# Patient Record
Sex: FEMALE | Race: WHITE | NOT HISPANIC OR LATINO | URBAN - NONMETROPOLITAN AREA
[De-identification: names, ages, dates, MRNs, and addresses within clinical notes are randomized per-mention and may not be internally consistent; named-entity substitution may affect disease eponyms.]

---

## 2018-06-19 PROBLEM — H52.4: Noted: 2018-06-19

## 2018-06-19 PROBLEM — H10.423: Noted: 2018-06-19

## 2018-06-19 PROBLEM — H26.491: Noted: 2018-06-19

## 2018-06-19 PROBLEM — H43.811: Noted: 2018-06-19

## 2018-06-19 PROBLEM — H52.223: Noted: 2018-06-19

## 2018-06-19 PROBLEM — Z96.1: Noted: 2018-06-19

## 2019-06-25 ENCOUNTER — IMPORTED ENCOUNTER (OUTPATIENT)
Dept: URBAN - NONMETROPOLITAN AREA CLINIC 1 | Facility: CLINIC | Age: 69
End: 2019-06-25

## 2019-06-25 PROCEDURE — 92014 COMPRE OPH EXAM EST PT 1/>: CPT

## 2019-06-25 PROCEDURE — 92015 DETERMINE REFRACTIVE STATE: CPT

## 2019-06-25 NOTE — PATIENT DISCUSSION
P/C IOL OU PCO OD:  -  Discussed findings w/ pt today-  Stable doing well at this time-  Mild PCO noted OD but not visually significant-  Hx of Yag PC OS open capsule noted-  Monitor yearly or PRNOcular Allergies OU-  discussed findings w/ pt today-  signs/ymptoms associated discussed-  currently using [facility] brand OTC products-  recommended Zaditor or [place] OU BID PRN itching written down for pt today-  monitor PRNA[place]-  Discussed refractive status in detail w/ pt today-  New GLRx given but not required. -  Monitor yearlyor PRNPVD OD:  -  Discussed findings of exam in detail with the patient. -  The risk of retinal detachment in patients with PVDs was discussed with the patient and the warning signs of retinal detachment were carefully reviewed with the patient. -  The patient was warned to return to the office or contact the ophthalmologist on call immediately if they experience signs of retinal detachment or changes in vision noted from today.  -  Continue to monitor.; 's Notes: MR  6/19/18DFE  6/19/18

## 2022-04-10 ASSESSMENT — TONOMETRY
OS_IOP_MMHG: 14
OD_IOP_MMHG: 15

## 2022-04-10 ASSESSMENT — VISUAL ACUITY
OS_CC: 20/25-
OD_CC: 20/30-